# Patient Record
Sex: FEMALE | Race: WHITE | HISPANIC OR LATINO
[De-identification: names, ages, dates, MRNs, and addresses within clinical notes are randomized per-mention and may not be internally consistent; named-entity substitution may affect disease eponyms.]

---

## 2023-04-07 PROBLEM — Z00.00 ENCOUNTER FOR PREVENTIVE HEALTH EXAMINATION: Status: ACTIVE | Noted: 2023-04-07

## 2023-04-10 ENCOUNTER — APPOINTMENT (OUTPATIENT)
Dept: SURGERY | Facility: CLINIC | Age: 68
End: 2023-04-10
Payer: MEDICARE

## 2023-04-10 VITALS
SYSTOLIC BLOOD PRESSURE: 120 MMHG | TEMPERATURE: 97.2 F | WEIGHT: 153 LBS | HEIGHT: 64 IN | HEART RATE: 63 BPM | DIASTOLIC BLOOD PRESSURE: 80 MMHG | OXYGEN SATURATION: 99 % | BODY MASS INDEX: 26.12 KG/M2

## 2023-04-10 DIAGNOSIS — Z87.891 PERSONAL HISTORY OF NICOTINE DEPENDENCE: ICD-10-CM

## 2023-04-10 DIAGNOSIS — Z78.9 OTHER SPECIFIED HEALTH STATUS: ICD-10-CM

## 2023-04-10 DIAGNOSIS — Z82.3 FAMILY HISTORY OF STROKE: ICD-10-CM

## 2023-04-10 DIAGNOSIS — Z80.9 FAMILY HISTORY OF MALIGNANT NEOPLASM, UNSPECIFIED: ICD-10-CM

## 2023-04-10 DIAGNOSIS — Z87.898 PERSONAL HISTORY OF OTHER SPECIFIED CONDITIONS: ICD-10-CM

## 2023-04-10 DIAGNOSIS — K80.10 CALCULUS OF GALLBLADDER WITH CHRONIC CHOLECYSTITIS W/OUT OBSTRUCTION: ICD-10-CM

## 2023-04-10 DIAGNOSIS — E03.9 HYPOTHYROIDISM, UNSPECIFIED: ICD-10-CM

## 2023-04-10 DIAGNOSIS — Z87.19 PERSONAL HISTORY OF OTHER DISEASES OF THE DIGESTIVE SYSTEM: ICD-10-CM

## 2023-04-10 DIAGNOSIS — Z87.42 PERSONAL HISTORY OF OTHER DISEASES OF THE FEMALE GENITAL TRACT: ICD-10-CM

## 2023-04-10 PROCEDURE — 99202 OFFICE O/P NEW SF 15 MIN: CPT

## 2023-04-10 PROCEDURE — 99072 ADDL SUPL MATRL&STAF TM PHE: CPT

## 2023-04-11 PROBLEM — Z87.898 FORMER CONSUMPTION OF ALCOHOL: Status: ACTIVE | Noted: 2023-04-10

## 2023-04-11 PROBLEM — Z80.9 FAMILY HISTORY OF MALIGNANT NEOPLASM: Status: ACTIVE | Noted: 2023-04-10

## 2023-04-11 PROBLEM — Z78.9 CAFFEINE USE: Status: ACTIVE | Noted: 2023-04-10

## 2023-04-11 PROBLEM — K80.10 CHOLECYSTITIS WITH CHOLELITHIASIS: Status: ACTIVE | Noted: 2023-04-11

## 2023-04-11 PROBLEM — Z82.3 FAMILY HISTORY OF CEREBROVASCULAR ACCIDENT (CVA): Status: ACTIVE | Noted: 2023-04-10

## 2023-04-11 PROBLEM — Z87.891 FORMER SMOKER: Status: ACTIVE | Noted: 2023-04-10

## 2023-04-11 PROBLEM — Z87.42 PERSONAL HISTORY OF ENDOMETRIOSIS: Status: ACTIVE | Noted: 2023-04-11

## 2023-04-11 PROBLEM — Z87.19 HISTORY OF BILIARY COLIC: Status: ACTIVE | Noted: 2023-04-11

## 2023-04-11 PROBLEM — E03.9 HYPOTHYROIDISM: Status: ACTIVE | Noted: 2023-04-10

## 2023-04-11 RX ORDER — FAMOTIDINE 20 MG/1
20 TABLET, FILM COATED ORAL
Qty: 30 | Refills: 0 | Status: ACTIVE | COMMUNITY
Start: 2023-03-31

## 2023-04-11 RX ORDER — LEVOTHYROXINE SODIUM 0.17 MG/1
TABLET ORAL
Refills: 0 | Status: ACTIVE | COMMUNITY

## 2023-04-11 RX ORDER — PANTOPRAZOLE 40 MG/1
40 TABLET, DELAYED RELEASE ORAL
Qty: 30 | Refills: 0 | Status: ACTIVE | COMMUNITY
Start: 2023-03-31

## 2023-04-11 RX ORDER — COVID-19 MOLECULAR TEST ASSAY
KIT MISCELLANEOUS
Qty: 8 | Refills: 0 | Status: ACTIVE | COMMUNITY
Start: 2023-01-27

## 2023-04-11 RX ORDER — GABAPENTIN 100 MG/1
100 CAPSULE ORAL
Qty: 90 | Refills: 0 | Status: ACTIVE | COMMUNITY
Start: 2023-02-08

## 2023-04-11 RX ORDER — CELECOXIB 200 MG/1
200 CAPSULE ORAL
Qty: 30 | Refills: 0 | Status: ACTIVE | COMMUNITY
Start: 2022-12-01

## 2023-04-11 RX ORDER — ASPIRIN 81 MG/1
81 TABLET, COATED ORAL
Qty: 30 | Refills: 0 | Status: ACTIVE | COMMUNITY
Start: 2023-02-08

## 2023-04-11 RX ORDER — BENZONATATE 100 MG/1
100 CAPSULE ORAL
Qty: 30 | Refills: 0 | Status: ACTIVE | COMMUNITY
Start: 2022-11-01

## 2023-04-11 RX ORDER — GLYCERIN/MIN OIL/POLYCARBOPHIL
GEL WITH APPLICATOR (GRAM) VAGINAL
Qty: 35 | Refills: 0 | Status: ACTIVE | COMMUNITY
Start: 2023-01-31

## 2023-04-11 RX ORDER — INDOMETHACIN 50 MG/1
50 CAPSULE ORAL
Qty: 30 | Refills: 0 | Status: ACTIVE | COMMUNITY
Start: 2022-11-01

## 2023-04-11 RX ORDER — CLOTRIMAZOLE AND BETAMETHASONE DIPROPIONATE 10; .5 MG/G; MG/G
1-0.05 CREAM TOPICAL
Qty: 45 | Refills: 0 | Status: ACTIVE | COMMUNITY
Start: 2023-01-31

## 2023-04-11 RX ORDER — LEVOTHYROXINE SODIUM 0.03 MG/1
25 TABLET ORAL
Qty: 90 | Refills: 0 | Status: ACTIVE | COMMUNITY
Start: 2023-02-08

## 2023-04-11 RX ORDER — DICYCLOMINE HYDROCHLORIDE 10 MG/1
10 CAPSULE ORAL
Qty: 30 | Refills: 0 | Status: ACTIVE | COMMUNITY
Start: 2023-03-30

## 2023-04-11 RX ORDER — FLUCONAZOLE 150 MG/1
150 TABLET ORAL
Qty: 1 | Refills: 0 | Status: ACTIVE | COMMUNITY
Start: 2023-01-31

## 2023-04-11 RX ORDER — BUPROPION HYDROCHLORIDE 300 MG/1
300 TABLET, EXTENDED RELEASE ORAL
Qty: 90 | Refills: 0 | Status: ACTIVE | COMMUNITY
Start: 2023-02-08

## 2023-04-11 RX ORDER — OMEPRAZOLE 20 MG/1
20 CAPSULE, DELAYED RELEASE ORAL
Qty: 30 | Refills: 0 | Status: ACTIVE | COMMUNITY
Start: 2022-11-01

## 2023-04-11 NOTE — DATA REVIEWED
[FreeTextEntry1] :  reviewed reports of recent abdominal sonogram and lab work.  The most recent blood work shows a normal hepatic function panel.   TSH level was also normal.

## 2023-04-11 NOTE — ASSESSMENT
[FreeTextEntry1] :   Symptomatic chronic cholecystitis and cholelithiasis, for which an elective cholecystectomy is advised, so as to try to avoid the possible complications of untreated gallstones, which were explained.  The sonogram images will be reviewed when available.  We will plan for a laparoscopic or robotic cholecystectomy with possible open exploration, and the procedure was discussed in full with its risks and benefits.  All her questions were answered and an appropriate surgical consent was obtained, and she will contact the office for scheduling

## 2023-04-11 NOTE — PHYSICAL EXAM
[Normal Thyroid] : the thyroid was normal [Normal Breath Sounds] : Normal breath sounds [Normal Heart Sounds] : normal heart sounds [Normal Rate and Rhythm] : normal rate and rhythm [Abdominal Masses] : No abdominal masses [No HSM] : no hepatosplenomegaly [de-identified] :   Healthy and anicteric [de-identified] :  no adenopathy [de-identified] :  soft and not distended.  No masses, hernias, or  organomegaly.  Healed incisions from her prior laparoscopy and hysterectomy as noted.  Gallbladder not palpable.  Mild tenderness to deep palpation in the right upper quadrant, which increases slightly with inspiration.  No rebound or guarding.

## 2023-04-11 NOTE — HISTORY OF PRESENT ILLNESS
[de-identified] :  the patient comes to be evaluated for an elective cholecystectomy.  She has a long history of occasional right flank pain radiating to the epigastrium, but this has been worse over the past 6 weeks, and especially so with any fatty food intake.  A sonogram done in [place] confirmed gallstones with no local acute changes or biliary dilation.  There is no history of hepatitis, pancreatitis, or jaundice but she has had a 5 to 10 pound weight loss over the past 6 weeks.